# Patient Record
Sex: FEMALE | Race: WHITE
[De-identification: names, ages, dates, MRNs, and addresses within clinical notes are randomized per-mention and may not be internally consistent; named-entity substitution may affect disease eponyms.]

---

## 2020-06-15 ENCOUNTER — ERX REFILL RESPONSE (OUTPATIENT)
Age: 80
End: 2020-06-15

## 2020-06-15 RX ORDER — PANCRELIPASE 36000; 180000; 114000 [USP'U]/1; [USP'U]/1; [USP'U]/1
TAKE TWO CAPSULES BY MOUTH THREE TIMES A DAY WITH MEAL(S) AND TAKE 1 CAPSULE BY MOUTH WITH EACH SNACK CAPSULE, DELAYED RELEASE PELLETS ORAL
Qty: 240 | Refills: 6

## 2021-03-26 ENCOUNTER — ERX REFILL RESPONSE (OUTPATIENT)
Dept: URBAN - METROPOLITAN AREA CLINIC 82 | Facility: CLINIC | Age: 81
End: 2021-03-26

## 2021-03-26 RX ORDER — PANCRELIPASE 36000; 180000; 114000 [USP'U]/1; [USP'U]/1; [USP'U]/1
TAKE TWO CAPSULES BY MOUTH THREE TIMES A DAY WITH MEAL(S) AND TAKE 1 CAPSULE BY MOUTH WITH EACH SNACK CAPSULE, DELAYED RELEASE PELLETS ORAL
Qty: 240 | Refills: 5

## 2021-05-11 ENCOUNTER — OFFICE VISIT (OUTPATIENT)
Dept: URBAN - METROPOLITAN AREA CLINIC 115 | Facility: CLINIC | Age: 81
End: 2021-05-11
Payer: MEDICARE

## 2021-05-11 ENCOUNTER — WEB ENCOUNTER (OUTPATIENT)
Dept: URBAN - METROPOLITAN AREA CLINIC 115 | Facility: CLINIC | Age: 81
End: 2021-05-11

## 2021-05-11 VITALS
SYSTOLIC BLOOD PRESSURE: 111 MMHG | TEMPERATURE: 98.2 F | DIASTOLIC BLOOD PRESSURE: 61 MMHG | WEIGHT: 105.2 LBS | BODY MASS INDEX: 18.64 KG/M2 | HEART RATE: 66 BPM | HEIGHT: 63 IN

## 2021-05-11 DIAGNOSIS — R19.7 DIARRHEA, UNSPECIFIED TYPE: ICD-10-CM

## 2021-05-11 DIAGNOSIS — Z85.038 HISTORY OF COLON CANCER: ICD-10-CM

## 2021-05-11 DIAGNOSIS — R10.31 RLQ ABDOMINAL PAIN: ICD-10-CM

## 2021-05-11 PROCEDURE — 74177 CT ABD & PELVIS W/CONTRAST: CPT | Performed by: INTERNAL MEDICINE

## 2021-05-11 PROCEDURE — 99214 OFFICE O/P EST MOD 30 MIN: CPT | Performed by: INTERNAL MEDICINE

## 2021-05-11 RX ORDER — HYOSCYAMINE SULFATE 0.12 MG/1
1 TABLET UNDER THE TONGUE AND ALLOW TO DISSOLVE  AS NEEDED TABLET, ORALLY DISINTEGRATING ORAL
Qty: 60 | Refills: 1 | OUTPATIENT

## 2021-05-11 RX ORDER — RIVAROXABAN 15 MG/1
TAKE 1 TABLET (15 MG) BY ORAL ROUTE ONCE DAILY WITH THE EVENING MEAL TABLET, FILM COATED ORAL 1
Qty: 0 | Refills: 0 | Status: ACTIVE | COMMUNITY
Start: 1900-01-01

## 2021-05-11 RX ORDER — GABAPENTIN 300 MG/1
TAKE 6 CAPSULES BY ORAL ROUTE DAILY CAPSULE ORAL 1
Qty: 0 | Refills: 0 | Status: ACTIVE | COMMUNITY
Start: 1900-01-01

## 2021-05-11 RX ORDER — PANCRELIPASE 36000; 180000; 114000 [USP'U]/1; [USP'U]/1; [USP'U]/1
TAKE TWO CAPSULES BY MOUTH THREE TIMES A DAY WITH MEAL(S) AND TAKE 1 CAPSULE BY MOUTH WITH EACH SNACK CAPSULE, DELAYED RELEASE PELLETS ORAL
Qty: 240 | Refills: 5 | Status: ACTIVE | COMMUNITY

## 2021-05-11 RX ORDER — FLECAINIDE ACETATE 50 MG/1
TAKE 1 TABLET (50 MG) BY ORAL ROUTE EVERY 12 HOURS TABLET ORAL 2
Qty: 0 | Refills: 0 | Status: ACTIVE | COMMUNITY
Start: 1900-01-01

## 2021-05-11 RX ORDER — CHROMIUM 200 MCG
TAKE 1 TABLET BY ORAL ROUTE QD TABLET ORAL 1
Qty: 1 | Refills: 0 | Status: DISCONTINUED | COMMUNITY
Start: 1900-01-01

## 2021-05-11 NOTE — HPI-OTHER HISTORIES
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 Prior fecal elastase low. C/o 3 days of diarrhea, no recent abx. Chr RLQ pain since surgery, dull, not related to BM. Periods of loose stool. No blood in stool. No fever. Reports EGD/colonoscopy 11/2020 at [place] reports unremarkable.

## 2021-05-12 ENCOUNTER — LAB OUTSIDE AN ENCOUNTER (OUTPATIENT)
Dept: URBAN - METROPOLITAN AREA CLINIC 115 | Facility: CLINIC | Age: 81
End: 2021-05-12

## 2021-05-12 LAB
A/G RATIO: 1.2
ALBUMIN: 3.6
ALKALINE PHOSPHATASE: 92
ALT (SGPT): 15
AST (SGOT): 22
BASO (ABSOLUTE): 0
BASOS: 0
BILIRUBIN, TOTAL: <0.2
BUN/CREATININE RATIO: 11
BUN: 15
CALCIUM: 9
CARBON DIOXIDE, TOTAL: 18
CHLORIDE: 111
CREATININE: 1.31
EGFR IF AFRICN AM: 43
EGFR IF NONAFRICN AM: 38
EOS (ABSOLUTE): 0.1
EOS: 2
GLOBULIN, TOTAL: 3.1
GLUCOSE: 173
HEMATOCRIT: 33.7
HEMATOLOGY COMMENTS:: (no result)
HEMOGLOBIN: 11.1
IMMATURE CELLS: (no result)
IMMATURE GRANS (ABS): 0
IMMATURE GRANULOCYTES: 0
LIPASE: 9
LYMPHS (ABSOLUTE): 1.4
LYMPHS: 22
MCH: 31.9
MCHC: 32.9
MCV: 97
MONOCYTES(ABSOLUTE): 0.5
MONOCYTES: 7
NEUTROPHILS (ABSOLUTE): 4.3
NEUTROPHILS: 69
NRBC: (no result)
PLATELETS: 183
POTASSIUM: 4.2
PROTEIN, TOTAL: 6.7
RBC: 3.48
RDW: 12.6
SODIUM: 141
T4,FREE(DIRECT): 1.2
TSH: 4.88
WBC: 6.4

## 2021-05-14 ENCOUNTER — LAB OUTSIDE AN ENCOUNTER (OUTPATIENT)
Dept: URBAN - METROPOLITAN AREA CLINIC 115 | Facility: CLINIC | Age: 81
End: 2021-05-14

## 2021-05-14 ENCOUNTER — TELEPHONE ENCOUNTER (OUTPATIENT)
Dept: URBAN - METROPOLITAN AREA CLINIC 92 | Facility: CLINIC | Age: 81
End: 2021-05-14

## 2021-05-14 PROBLEM — 429699009: Status: ACTIVE | Noted: 2021-05-11

## 2021-05-14 LAB
CALPROTECTIN, STOOL - QDX: (no result)
CREATININE POC: 1.5
GASTROINTESTINAL PATHOGEN: (no result)
PANCREATICELASTASE ELISA, STOOL: (no result)
PERFORMING LAB: (no result)

## 2021-06-11 ENCOUNTER — DASHBOARD ENCOUNTERS (OUTPATIENT)
Age: 81
End: 2021-06-11

## 2021-06-11 ENCOUNTER — OFFICE VISIT (OUTPATIENT)
Dept: URBAN - METROPOLITAN AREA CLINIC 115 | Facility: CLINIC | Age: 81
End: 2021-06-11
Payer: MEDICARE

## 2021-06-11 ENCOUNTER — WEB ENCOUNTER (OUTPATIENT)
Dept: URBAN - METROPOLITAN AREA CLINIC 115 | Facility: CLINIC | Age: 81
End: 2021-06-11

## 2021-06-11 DIAGNOSIS — K86.81 EXOCRINE PANCREATIC INSUFFICIENCY: ICD-10-CM

## 2021-06-11 DIAGNOSIS — K58.0 IRRITABLE BOWEL SYNDROME WITH DIARRHEA: ICD-10-CM

## 2021-06-11 PROBLEM — 197125005: Status: ACTIVE | Noted: 2021-06-11

## 2021-06-11 PROCEDURE — 99214 OFFICE O/P EST MOD 30 MIN: CPT | Performed by: INTERNAL MEDICINE

## 2021-06-11 RX ORDER — FLECAINIDE ACETATE 50 MG/1
TAKE 1 TABLET (50 MG) BY ORAL ROUTE EVERY 12 HOURS TABLET ORAL 2
Qty: 0 | Refills: 0 | Status: ACTIVE | COMMUNITY
Start: 1900-01-01

## 2021-06-11 RX ORDER — COLESEVELAM HYDROCHLORIDE 3.75 G/1
1 PACKET MIXED WITH WATER WITH A MEAL FOR SUSPENSION ORAL ONCE A DAY
Qty: 30 | Refills: 1 | OUTPATIENT

## 2021-06-11 RX ORDER — PANCRELIPASE 36000; 180000; 114000 [USP'U]/1; [USP'U]/1; [USP'U]/1
TAKE TWO CAPSULES BY MOUTH THREE TIMES A DAY WITH MEAL(S) AND TAKE 1 CAPSULE BY MOUTH WITH EACH SNACK CAPSULE, DELAYED RELEASE PELLETS ORAL
Qty: 240 | Refills: 5 | Status: ACTIVE | COMMUNITY

## 2021-06-11 RX ORDER — RIFAXIMIN 550 MG/1
1 TABLET TABLET ORAL THREE TIMES A DAY
Qty: 42 TABLET | Refills: 1 | OUTPATIENT

## 2021-06-11 RX ORDER — HYOSCYAMINE SULFATE 0.12 MG/1
1 TABLET UNDER THE TONGUE AND ALLOW TO DISSOLVE  AS NEEDED TABLET, ORALLY DISINTEGRATING ORAL
Qty: 60 | Refills: 1 | Status: ACTIVE | COMMUNITY

## 2021-06-11 RX ORDER — RIVAROXABAN 15 MG/1
TAKE 1 TABLET (15 MG) BY ORAL ROUTE ONCE DAILY WITH THE EVENING MEAL TABLET, FILM COATED ORAL 1
Qty: 0 | Refills: 0 | Status: ACTIVE | COMMUNITY
Start: 1900-01-01

## 2021-06-11 RX ORDER — GABAPENTIN 300 MG/1
TAKE 6 CAPSULES BY ORAL ROUTE DAILY CAPSULE ORAL 1
Qty: 0 | Refills: 0 | Status: ACTIVE | COMMUNITY
Start: 1900-01-01

## 2021-06-11 NOTE — HPI-OTHER HISTORIES
11/2020. Labs reviewed, low fecal elastase, gpp/calprotectin normal, T4/celiac nml. Months, solid bm in am then loose stools non bloody. Imodium and lomotil hadnt worked. CT abd/pel, no acute abnml, reviewed. 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 Prior fecal elastase low. C/o 3 days of diarrhea, no recent abx. Chr RLQ pain since surgery, dull, not related to BM. Periods of loose stool. No blood in stool. No fever. Reports EGD/colonoscopy 11/2020 at [facility] reports unremarkable.

## 2021-07-23 ENCOUNTER — OFFICE VISIT (OUTPATIENT)
Dept: URBAN - METROPOLITAN AREA CLINIC 115 | Facility: CLINIC | Age: 81
End: 2021-07-23

## 2021-08-26 ENCOUNTER — TELEPHONE ENCOUNTER (OUTPATIENT)
Dept: URBAN - METROPOLITAN AREA CLINIC 23 | Facility: CLINIC | Age: 81
End: 2021-08-26

## 2021-10-19 ENCOUNTER — OFFICE VISIT (OUTPATIENT)
Dept: URBAN - METROPOLITAN AREA CLINIC 115 | Facility: CLINIC | Age: 81
End: 2021-10-19

## 2021-12-14 ENCOUNTER — TELEPHONE ENCOUNTER (OUTPATIENT)
Dept: URBAN - METROPOLITAN AREA CLINIC 92 | Facility: CLINIC | Age: 81
End: 2021-12-14

## 2021-12-14 RX ORDER — PANCRELIPASE 36000; 180000; 114000 [USP'U]/1; [USP'U]/1; [USP'U]/1
AS DIRECTED CAPSULE, DELAYED RELEASE PELLETS ORAL
Qty: 250 | Refills: 5

## 2021-12-16 ENCOUNTER — ERX REFILL RESPONSE (OUTPATIENT)
Dept: URBAN - METROPOLITAN AREA CLINIC 82 | Facility: CLINIC | Age: 81
End: 2021-12-16

## 2021-12-16 RX ORDER — PANCRELIPASE 36000; 180000; 114000 [USP'U]/1; [USP'U]/1; [USP'U]/1
TAKE TWO CAPSULES BY MOUTH THREE TIMES A DAY WITH MEALS AND TAKE ONE CAPSULE BY MOUTH WITH EACH SNACK CAPSULE, DELAYED RELEASE PELLETS ORAL
Qty: 240 CAPSULE | Refills: 6 | OUTPATIENT

## 2022-10-11 ENCOUNTER — ERX REFILL RESPONSE (OUTPATIENT)
Dept: URBAN - METROPOLITAN AREA CLINIC 82 | Facility: CLINIC | Age: 82
End: 2022-10-11

## 2022-10-11 RX ORDER — PANCRELIPASE 36000; 180000; 114000 [USP'U]/1; [USP'U]/1; [USP'U]/1
TAKE TWO CAPSULES BY MOUTH THREE TIMES A DAY WITH MEALS AND TAKE ONE CAPSULE BY MOUTH WITH EACH SNACK CAPSULE, DELAYED RELEASE PELLETS ORAL
Qty: 240 CAPSULE | Refills: 6 | OUTPATIENT

## 2022-10-11 RX ORDER — PANCRELIPASE 36000; 180000; 114000 [USP'U]/1; [USP'U]/1; [USP'U]/1
TAKE TWO CAPSULES BY MOUTH THREE TIMES A DAY WITH MEALS AND TAKE ONE CAPSULE WITH EACH SNACK CAPSULE, DELAYED RELEASE PELLETS ORAL
Qty: 240 CAPSULE | Refills: 5 | OUTPATIENT